# Patient Record
Sex: MALE | URBAN - METROPOLITAN AREA
[De-identification: names, ages, dates, MRNs, and addresses within clinical notes are randomized per-mention and may not be internally consistent; named-entity substitution may affect disease eponyms.]

---

## 2017-04-07 ENCOUNTER — EMERGENCY (EMERGENCY)
Facility: HOSPITAL | Age: 22
LOS: 1 days | Discharge: HOME | End: 2017-04-07
Attending: EMERGENCY MEDICINE
Payer: COMMERCIAL

## 2017-04-07 VITALS
TEMPERATURE: 98 F | HEART RATE: 83 BPM | OXYGEN SATURATION: 100 % | RESPIRATION RATE: 18 BRPM | DIASTOLIC BLOOD PRESSURE: 86 MMHG | SYSTOLIC BLOOD PRESSURE: 145 MMHG

## 2017-04-07 VITALS — HEIGHT: 70 IN | WEIGHT: 205.91 LBS

## 2017-04-07 DIAGNOSIS — R07.9 CHEST PAIN, UNSPECIFIED: ICD-10-CM
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
## 2017-04-07 DIAGNOSIS — R68.84 JAW PAIN: ICD-10-CM
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
## 2017-04-07 DIAGNOSIS — R51 HEADACHE: ICD-10-CM

## 2017-04-07 DIAGNOSIS — M54.5 LOW BACK PAIN: ICD-10-CM

## 2017-04-07 PROCEDURE — 99284 EMERGENCY DEPT VISIT MOD MDM: CPT

## 2017-04-07 PROCEDURE — 99284 EMERGENCY DEPT VISIT MOD MDM: CPT | Mod: 25

## 2017-04-07 PROCEDURE — 70110 X-RAY EXAM OF JAW 4/> VIEWS: CPT | Mod: 26

## 2017-04-07 PROCEDURE — 70110 X-RAY EXAM OF JAW 4/> VIEWS: CPT

## 2017-04-07 RX ORDER — IBUPROFEN 200 MG
600 TABLET ORAL ONCE
Qty: 0 | Refills: 0 | Status: COMPLETED | OUTPATIENT
Start: 2017-04-07 | End: 2017-04-07

## 2017-04-07 RX ORDER — METHOCARBAMOL 500 MG/1
1500 TABLET, FILM COATED ORAL ONCE
Qty: 0 | Refills: 0 | Status: COMPLETED | OUTPATIENT
Start: 2017-04-07 | End: 2017-04-07

## 2017-04-07 RX ORDER — METHOCARBAMOL 500 MG/1
1 TABLET, FILM COATED ORAL
Qty: 14 | Refills: 0 | OUTPATIENT
Start: 2017-04-07 | End: 2017-04-14

## 2017-04-07 RX ADMIN — Medication 600 MILLIGRAM(S): at 11:04

## 2017-04-07 RX ADMIN — METHOCARBAMOL 1500 MILLIGRAM(S): 500 TABLET, FILM COATED ORAL at 11:04

## 2017-04-07 NOTE — ED STATDOCS - MUSCULOSKELETAL, MLM
pain to paraspinal lumbar area, no tenderness para lumbar area. tenderness to L anterior ribs, no crepitations, tenderness to L mandible, FROM of mouth, able to fully open mouth, pain w/ chewing motion, no pain w/ grinding, no swelling noted, neck no step offs, no tenderness

## 2017-04-07 NOTE — ED STATDOCS - NS ED MD SCRIBE ATTENDING SCRIBE SECTIONS
VITAL SIGNS( Pullset)/HISTORY OF PRESENT ILLNESS/PHYSICAL EXAM/HIV/DISPOSITION/REVIEW OF SYSTEMS/PAST MEDICAL/SURGICAL/SOCIAL HISTORY
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
## 2019-09-17 NOTE — ED ADULT NURSE NOTE - NEURO WDL
How Severe Are Your Spot(S)?: mild Have Your Spot(S) Been Treated In The Past?: has not been treated Hpi Title: Evaluation of Skin Lesions Alert and oriented to person, place and time, memory intact, behavior appropriate to situation, PERRL.

## 2021-12-26 ENCOUNTER — TRANSCRIPTION ENCOUNTER (OUTPATIENT)
Age: 26
End: 2021-12-26

## 2022-12-14 ENCOUNTER — OFFICE (OUTPATIENT)
Dept: URBAN - METROPOLITAN AREA CLINIC 94 | Facility: CLINIC | Age: 27
Setting detail: OPHTHALMOLOGY
End: 2022-12-14
Payer: COMMERCIAL

## 2022-12-14 DIAGNOSIS — H52.13: ICD-10-CM

## 2022-12-14 PROCEDURE — SCREF LASIK EVAL: Performed by: OPHTHALMOLOGY

## 2022-12-14 ASSESSMENT — TONOMETRY
OD_IOP_MMHG: 18
OS_IOP_MMHG: 20

## 2022-12-14 ASSESSMENT — CONFRONTATIONAL VISUAL FIELD TEST (CVF)
OS_FINDINGS: FULL
OD_FINDINGS: FULL

## 2022-12-15 ASSESSMENT — REFRACTION_MANIFEST
OD_AXIS: 100
OS_SPHERE: PLANO
OD_VA1: 20/20-1
OD_CYLINDER: -1.75
OD_CYLINDER: -1.75
OD_SPHERE: PLANO
OS_CYLINDER: -1.75
OD_AXIS: 100
OS_CYLINDER: -1.75
OS_VA1: 20/20
OD_SPHERE: PLANO
OS_SPHERE: PLANO
OS_VA1: 20/20-1
OS_AXIS: 070
OS_AXIS: 070
OD_VA1: 20/20

## 2022-12-15 ASSESSMENT — KERATOMETRY
OS_K1POWER_DIOPTERS: 44.25
OD_K2POWER_DIOPTERS: 45.25
OS_AXISANGLE_DEGREES: 171
OD_AXISANGLE_DEGREES: 001
OS_K2POWER_DIOPTERS: 45.50
OD_K1POWER_DIOPTERS: 44.25

## 2022-12-15 ASSESSMENT — SPHEQUIV_DERIVED
OD_SPHEQUIV: -0.625
OS_SPHEQUIV: -1

## 2022-12-15 ASSESSMENT — REFRACTION_AUTOREFRACTION
OS_SPHERE: 0.00
OD_CYLINDER: -1.75
OD_AXIS: 101
OS_AXIS: 069
OS_CYLINDER: -2.00
OD_SPHERE: +0.25

## 2022-12-15 ASSESSMENT — AXIALLENGTH_DERIVED
OD_AL: 23.3782
OS_AL: 23.477

## 2022-12-15 ASSESSMENT — VISUAL ACUITY
OS_BCVA: 20/40
OD_BCVA: 20/30-1

## 2024-01-26 NOTE — ED STATDOCS - PRINCIPAL DIAGNOSIS
Noted.  Please also notify the patient that the alternate medication to Paxlovid was called into the pharmacy yesterday, if it needs to be picked up over the weekend.   Back pain